# Patient Record
Sex: FEMALE
[De-identification: names, ages, dates, MRNs, and addresses within clinical notes are randomized per-mention and may not be internally consistent; named-entity substitution may affect disease eponyms.]

---

## 2017-11-27 ENCOUNTER — HOSPITAL ENCOUNTER (OUTPATIENT)
Dept: HOSPITAL 45 - C.PATHSPEC | Age: 62
Discharge: HOME | End: 2017-11-27
Attending: UROLOGY
Payer: COMMERCIAL

## 2017-11-27 DIAGNOSIS — R30.0: Primary | ICD-10-CM

## 2017-11-27 DIAGNOSIS — R31.29: ICD-10-CM

## 2018-01-04 ENCOUNTER — HOSPITAL ENCOUNTER (OUTPATIENT)
Dept: HOSPITAL 45 - C.LABPBG | Age: 63
Discharge: HOME | End: 2018-01-04
Attending: PHYSICIAN ASSISTANT
Payer: COMMERCIAL

## 2018-01-04 DIAGNOSIS — Z00.00: Primary | ICD-10-CM

## 2018-01-04 DIAGNOSIS — R82.8: ICD-10-CM

## 2018-01-04 DIAGNOSIS — R32: ICD-10-CM

## 2018-01-04 LAB
ALBUMIN SERPL-MCNC: 4 GM/DL (ref 3.4–5)
ALP SERPL-CCNC: 81 U/L (ref 45–117)
ALT SERPL-CCNC: 28 U/L (ref 12–78)
AST SERPL-CCNC: 19 U/L (ref 15–37)
BUN SERPL-MCNC: 9 MG/DL (ref 7–18)
CALCIUM SERPL-MCNC: 10.2 MG/DL (ref 8.5–10.1)
CO2 SERPL-SCNC: 24 MMOL/L (ref 21–32)
CREAT SERPL-MCNC: 1.08 MG/DL (ref 0.6–1.2)
KETONES UR QL STRIP: 84 MG/DL
PH UR: 182 MG/DL (ref 0–200)
POTASSIUM SERPL-SCNC: 3.8 MMOL/L (ref 3.5–5.1)
PROT SERPL-MCNC: 8.5 GM/DL (ref 6.4–8.2)
SODIUM SERPL-SCNC: 138 MMOL/L (ref 136–145)

## 2018-02-22 ENCOUNTER — HOSPITAL ENCOUNTER (OUTPATIENT)
Dept: HOSPITAL 45 - C.NEUR | Age: 63
Discharge: HOME | End: 2018-02-22
Attending: PSYCHIATRY & NEUROLOGY
Payer: COMMERCIAL

## 2018-02-22 DIAGNOSIS — R56.9: Primary | ICD-10-CM

## 2018-02-22 NOTE — EEG PROCEDURE NOTE
EEG Procedure Note


Date of Service


Feb 22, 2018.





Start / End Times


Start Time:  2:34 PM


End Time:  3:34 PM





Referring Physician


Laila Rubio





History


This is a 62-year-old female with a single seizure-like event and reports of 

questionable abnormal EEG. One hour extended EEG for further evaluation of 

possible underlying epilepsy.





Description


This is a 21 electrode EEG with a single channel dedicated to limited EKG. The 

electrodes were placed in accordance with the International 10-20 system.





At the start of the recording the patient was in an awake state. Background was 

well organized and composed of symmetric mixed alpha and beta frequencies. 

There was a symmetric well-formed moderate amplitude 11-12 Hz posterior 

dominant rhythm that was reactive to eye opening and closure.  Hyperventilation 

with good effort produced no abnormalities. Intermittent photic stimulation at 

various frequencies produced no abnormalities. Sleep was indicated by vertex 

waves and symmetric sleep spindles.





Interpretation


This is a normal awake and asleep 1 hour extended EEG. 





There was no electrographic seizures or epileptiform discharges.





Clinical Correlation


A normal EEG does not rule out epilepsy if there is a strong clinical suspicion.